# Patient Record
Sex: MALE | Race: WHITE | NOT HISPANIC OR LATINO | Employment: OTHER | URBAN - METROPOLITAN AREA
[De-identification: names, ages, dates, MRNs, and addresses within clinical notes are randomized per-mention and may not be internally consistent; named-entity substitution may affect disease eponyms.]

---

## 2024-08-29 ENCOUNTER — OFFICE VISIT (OUTPATIENT)
Dept: ORTHOPEDIC SURGERY | Facility: CLINIC | Age: 79
End: 2024-08-29
Payer: MEDICARE

## 2024-08-29 VITALS
WEIGHT: 224.4 LBS | SYSTOLIC BLOOD PRESSURE: 120 MMHG | BODY MASS INDEX: 36.07 KG/M2 | HEIGHT: 66 IN | DIASTOLIC BLOOD PRESSURE: 72 MMHG

## 2024-08-29 DIAGNOSIS — M75.42 IMPINGEMENT SYNDROME OF LEFT SHOULDER: ICD-10-CM

## 2024-08-29 DIAGNOSIS — M75.22 BICEPS TENDINITIS OF LEFT UPPER EXTREMITY: ICD-10-CM

## 2024-08-29 DIAGNOSIS — M25.511 BILATERAL SHOULDER PAIN, UNSPECIFIED CHRONICITY: ICD-10-CM

## 2024-08-29 DIAGNOSIS — M25.512 BILATERAL SHOULDER PAIN, UNSPECIFIED CHRONICITY: ICD-10-CM

## 2024-08-29 DIAGNOSIS — S46.001A INJURY OF RIGHT ROTATOR CUFF, INITIAL ENCOUNTER: ICD-10-CM

## 2024-08-29 DIAGNOSIS — M75.122 NONTRAUMATIC COMPLETE TEAR OF LEFT ROTATOR CUFF: Primary | ICD-10-CM

## 2024-08-29 DIAGNOSIS — M75.51 BURSITIS OF RIGHT SHOULDER: ICD-10-CM

## 2024-08-29 DIAGNOSIS — M75.41 IMPINGEMENT SYNDROME OF RIGHT SHOULDER: ICD-10-CM

## 2024-08-29 DIAGNOSIS — S46.002A INJURY OF LEFT ROTATOR CUFF, INITIAL ENCOUNTER: ICD-10-CM

## 2024-08-29 DIAGNOSIS — M75.52 BURSITIS OF LEFT SHOULDER: ICD-10-CM

## 2024-08-29 RX ORDER — CHOLECALCIFEROL (VITAMIN D3) 25 MCG
1000 TABLET ORAL 2 TIMES DAILY PRN
COMMUNITY

## 2024-08-29 NOTE — PROGRESS NOTES
"                                                                    [facility] Office Visit - Mario Clay MD  [facility]    Office Visit       Patient Name: Teofilo Florez    Chief Complaint:   Chief Complaint   Patient presents with    Left Shoulder - Pain, Initial Evaluation    Right Shoulder - Initial Evaluation, Pain       Referring Physician: Referring, Self    History of Present Illness:   Teofilo Florez is a 78 y.o. male who presents with bilateral body part: shoulder Reason: pain.  Onset:Onset: mechanical fall. The issue has been ongoing for 1+ year(s). Pain is a 2/10 on the pain scale (right shoulder) and 6/10 (left shoulder) . Pain is described as Pain Characterization: dull, aching, and stabbing. Associated symptoms include Symptoms: pain, popping, and stiffness. The pain is worse with sleeping, working, leisure, lying on affected side, and any movement of the joint; resting improve the pain. Previous treatments have included: NSAIDS and physical therapy.  I have reviewed the patient's history of present illness as noted/entered above.    I have reviewed the patient's past medical history, surgical history, social history, family history, medications, and allergies as noted in the electronic medical record and as noted/entered.  I have reviewed the patient's review of systems as noted/enter and updated as noted in the patient's HPI.    Bilateral shoulder pain  Right shoulder-fall 1.5 years ago, left shoulder pain at the gym 5 weeks ago    [facility]    Retired  Prior physical therapy in the past.    Granddaughter getting  in [place] soon    I've treated his wife, Amparo, in the past; left sided revision to [facility]    BILATERAL SHOULDER WEAKNESS -- \"very weak\"    LEFT is worse than the right  RHD            78 y.o. male  BMI 36.22    Subjective   Subjective      Review of Systems   Constitutional: Negative.  Negative for " chills, fatigue and fever.   HENT: Negative.  Negative for congestion and dental problem.    Eyes: Negative.  Negative for blurred vision.   Respiratory: Negative.  Negative for shortness of breath.    Cardiovascular: Negative.  Negative for leg swelling.   Gastrointestinal: Negative.  Negative for abdominal pain.   Endocrine: Negative.  Negative for polyuria.   Genitourinary: Negative.  Negative for difficulty urinating.   Musculoskeletal:  Positive for arthralgias.   Skin: Negative.    Allergic/Immunologic: Negative.    Neurological: Negative.    Hematological: Negative.  Negative for adenopathy.   Psychiatric/Behavioral: Negative.  Negative for behavioral problems.         Past Medical History:   Past Medical History:   Diagnosis Date    Ankle sprain     Cervical disc disorder     Knee swelling     Lumbosacral disc disease     Osgood-Schlatter's disease     Tear of meniscus of knee        Past Surgical History:   Past Surgical History:   Procedure Laterality Date    JOINT REPLACEMENT      KNEE SURGERY         Family History: History reviewed. No pertinent family history.    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    Drug use: Never    Sexual activity: Not Currently     Partners: Female       Medications:   Current Outpatient Medications:     aspirin 81 MG EC tablet, Take 1 tablet by mouth Daily., Disp: , Rfl:     Cholecalciferol 25 MCG (1000 UT) tablet, Take 1 tablet by mouth 2 (Two) Times a Day As Needed., Disp: , Rfl:     doxazosin (CARDURA) 8 MG tablet, Take 1 tablet by mouth every night., Disp: 30 tablet, Rfl: 1    esomeprazole (NexIUM) 40 MG capsule, Take 1 capsule by mouth every morning before breakfast., Disp: 30  "capsule, Rfl: 1    simvastatin (ZOCOR) 40 MG tablet, Take 1 tablet by mouth every night., Disp: 30 tablet, Rfl: 1    valsartan (DIOVAN) 40 MG tablet, , Disp: , Rfl:     Allergies: No Known Allergies    The following portions of the patient's history were reviewed and updated as appropriate: allergies, current medications, past family history, past medical history, past social history, past surgical history and problem list.        Objective    Objective      Vital Signs:   Vitals:    08/29/24 0944   BP: 120/72   Weight: 102 kg (224 lb 6.4 oz)   Height: 167.6 cm (66\")       Ortho Exam:  Left shoulder worse than right shoulder.  Significant pain and weakness of the left shoulder.    General: no acute distress, comfortable  Vitals reviewed in chart    Musculoskeletal Exam    SIDE: Left worse than right  Shoulder Exam:    Tenderness: rotator cuff    Range of motion measurements (degrees)  Forward flexion/Abduction/External rotation at side/ER at 90/IR at 90/IR position  Active: Pain and weakness with left shoulder, pain limited  Passive: Pain limited    Painful arc of motion: yes  No evidence of septic joint  Pain with forward flexion and abduction greater: 90 degrees  Impingement testing Neer's test - positive/painful  Impingement testing Hawkin's test - positive/painful    Rotator Cuff Testing:  Tenderness to palpation at rotator cuff - YES  Rotator cuff testing Conchita's test - positive  Rotator cuff testing External rotation -positive  Rotator cuff testing Lag signs - no  Rotator cuff testing Belly press - no  Pain with abduction great than 90 degrees - yes    Scapular dyskinesis - present, abnormal scapular motion    Long head of the biceps testing:  Hamm's test for biceps & Speed's test -positive  Bicipital groove tenderness to palpation/tenderness to palpation of biceps tendon -positive      Results Review:   Imaging Results (Last 24 Hours)       Procedure Component Value Units Date/Time    XR Shoulder 2+ View " Bilateral [[id number]] Resulted: 08/29/24 1008     Updated: 08/29/24 1009    Narrative:      Imaging: BILATERAL shoulder x-rays 3 views     Side: BILATERAL     Indication for BILATERAL  shoulder x-ray 3 views: BILATERAL shoulder pain    Comparison: no comparison views available    BILATERAL SHOULDER Findings: Bilateral shoulder imaging shows baseline   degenerative changes.  No acute bony pathology. No superior humeral head   migration.  The humeral head remains centered in the glenohumeral joint.   No evidence of calcific tendonitis.      I personally reviewed the above x-rays.            Procedures             Assessment / Plan      Assessment/Plan:   Problem List Items Addressed This Visit          Musculoskeletal and Injuries    Bilateral shoulder pain    Relevant Orders    XR Shoulder 2+ View Bilateral (Completed)    MRI Shoulder Left Without Contrast    Nontraumatic complete tear of left rotator cuff - Primary    Relevant Orders    MRI Shoulder Left Without Contrast    Injury of left rotator cuff    Relevant Orders    MRI Shoulder Left Without Contrast    Biceps tendinitis of left upper extremity    Relevant Orders    MRI Shoulder Left Without Contrast    Impingement syndrome of left shoulder    Relevant Orders    MRI Shoulder Left Without Contrast    Bursitis of left shoulder    Relevant Orders    MRI Shoulder Left Without Contrast    Injury of right rotator cuff    Relevant Orders    MRI Shoulder Left Without Contrast    Bursitis of right shoulder    Relevant Orders    MRI Shoulder Left Without Contrast    Impingement syndrome of right shoulder    Relevant Orders    MRI Shoulder Left Without Contrast       LEFT worse than right shoulder  MRI of the shoulder is recommended.  Indication: suspected rotator cuff tear  The MRI is critical to evaluate for rotator cuff tearing and will help with possible surgical planning.    Left shoulder much worse than right.  Left shoulder MRI indicated      Follow Up: After  left shoulder MRI        Mario Clay MD, FAAOS  Orthopedic Surgeon, Shoulder Surgery  [facility]  Orthopedics and Sports Medicine  [address], [address]  [place] [address]    & New Location:  [facility] Location  [address], [address]  [place] [address]    08/29/24  13:20 EDT

## 2024-09-11 ENCOUNTER — HOSPITAL ENCOUNTER (OUTPATIENT)
Facility: HOSPITAL | Age: 79
Discharge: HOME | End: 2024-09-11
Admitting: ORTHOPAEDIC SURGERY
Payer: MEDICARE

## 2024-09-11 DIAGNOSIS — M25.511 BILATERAL SHOULDER PAIN, UNSPECIFIED CHRONICITY: ICD-10-CM

## 2024-09-11 DIAGNOSIS — M75.22 BICEPS TENDINITIS OF LEFT UPPER EXTREMITY: ICD-10-CM

## 2024-09-11 DIAGNOSIS — M75.52 BURSITIS OF LEFT SHOULDER: ICD-10-CM

## 2024-09-11 DIAGNOSIS — M75.42 IMPINGEMENT SYNDROME OF LEFT SHOULDER: ICD-10-CM

## 2024-09-11 DIAGNOSIS — M75.51 BURSITIS OF RIGHT SHOULDER: ICD-10-CM

## 2024-09-11 DIAGNOSIS — M75.122 NONTRAUMATIC COMPLETE TEAR OF LEFT ROTATOR CUFF: ICD-10-CM

## 2024-09-11 DIAGNOSIS — M25.512 BILATERAL SHOULDER PAIN, UNSPECIFIED CHRONICITY: ICD-10-CM

## 2024-09-11 DIAGNOSIS — S46.002A INJURY OF LEFT ROTATOR CUFF, INITIAL ENCOUNTER: ICD-10-CM

## 2024-09-11 DIAGNOSIS — S46.001A INJURY OF RIGHT ROTATOR CUFF, INITIAL ENCOUNTER: ICD-10-CM

## 2024-09-11 DIAGNOSIS — M75.41 IMPINGEMENT SYNDROME OF RIGHT SHOULDER: ICD-10-CM

## 2024-09-11 PROCEDURE — 73221 MRI JOINT UPR EXTREM W/O DYE: CPT

## 2024-09-19 ENCOUNTER — OFFICE VISIT (OUTPATIENT)
Dept: ORTHOPEDIC SURGERY | Facility: CLINIC | Age: 79
End: 2024-09-19
Payer: MEDICARE

## 2024-09-19 VITALS
DIASTOLIC BLOOD PRESSURE: 72 MMHG | WEIGHT: 225 LBS | SYSTOLIC BLOOD PRESSURE: 118 MMHG | BODY MASS INDEX: 35.31 KG/M2 | HEIGHT: 67 IN

## 2024-09-19 DIAGNOSIS — M75.122 NONTRAUMATIC COMPLETE TEAR OF LEFT ROTATOR CUFF: ICD-10-CM

## 2024-09-19 DIAGNOSIS — M75.102 ROTATOR CUFF TEAR ARTHROPATHY OF LEFT SHOULDER: Primary | ICD-10-CM

## 2024-09-19 DIAGNOSIS — S46.002A INJURY OF LEFT ROTATOR CUFF, INITIAL ENCOUNTER: ICD-10-CM

## 2024-09-19 DIAGNOSIS — M12.812 ROTATOR CUFF TEAR ARTHROPATHY OF LEFT SHOULDER: Primary | ICD-10-CM

## 2024-10-02 ENCOUNTER — TREATMENT (OUTPATIENT)
Dept: PHYSICAL THERAPY | Facility: CLINIC | Age: 79
End: 2024-10-02
Payer: MEDICARE

## 2024-10-02 DIAGNOSIS — M75.102 NONTRAUMATIC TEAR OF LEFT ROTATOR CUFF, UNSPECIFIED TEAR EXTENT: Primary | ICD-10-CM

## 2024-10-02 NOTE — PROGRESS NOTES
Physical Therapy Initial Evaluation and Plan of Care  [facility] [place]  [address], [address] [address]        Patient: Teofilo Florez   : 1945  Diagnosis/ICD-10 Code:  Nontraumatic tear of left rotator cuff, unspecified tear extent [M75.102]  Referring practitioner: Mario Clay MD  Date of Initial Visit: 10/2/2024  Today's Date: 10/2/2024  Patient seen for 1 sessions         Visit Diagnoses:    ICD-10-CM ICD-9-CM   1. Nontraumatic tear of left rotator cuff, unspecified tear extent  M75.102 727.61         Subjective Questionnaire: QuickDASH: 45.45%    Subjective Evaluation    History of Present Illness  Mechanism of injury: 6-7 weeks ago at gym, pushing on a weight machine, felt immediate pain top of left shoulder down into biceps.  2-3 weeks later, pulled on a cart at [facility], severe pain started.    Pain  Current pain ratin  At best pain rating: 3  At worst pain ratin  Location: Constant left GH joint pain, anterior to posterior, some pain into scapula.  Quality: dull ache, squeezing, pulling, knife-like and discomfort  Relieving factors: rest and support  Aggravating factors: sleeping, repetitive movement, outstretched reach, lifting and movement  Progression: improved    Hand dominance: right    Diagnostic Tests  MRI studies: abnormal           Objective          Observations   Left Shoulder   Positive for atrophy.       Palpation   Left   Tenderness of the anterior deltoid and middle deltoid.     Tenderness     Left Shoulder   Tenderness in the biceps tendon (proximal) and subacromial bursa.     Cervical/Thoracic Screen   Cervical range of motion within normal limits  Thoracic range of motion within normal limits    Active Range of Motion   Left Shoulder   Flexion: 62 degrees   Extension: 52 degrees   Abduction: 62 degrees   External rotation 0°: 10 degrees   External rotation 45°: 5 degrees   Internal rotation BTB: L3     Passive Range of Motion    Left Shoulder   Flexion: 170 degrees   Abduction: 165 degrees   External rotation 90°: 85 degrees   Internal rotation 90°: 60 degrees     Joint Play   Left Shoulder  Joints within functional limits are the anterior capsule, posterior capsule and inferior capsule.     Strength/Myotome Testing     Left Shoulder     Planes of Motion   Flexion: 4-   Extension: 4+   Abduction: 3-   External rotation at 0°: 2-   Internal rotation at 0°: 4+     Isolated Muscles   Biceps: 5   Infraspinatus: 3   Subscapularis: 4-   Supraspinatus: 1   Triceps: 5     Tests     Left Shoulder   Positive drop arm, empty can and painful arc.   Negative Hawkin's, laxity (step off) and Neer's.           Assessment & Plan       Assessment  Impairments: abnormal muscle firing, abnormal or restricted ROM, activity intolerance, impaired physical strength and pain with function   Functional limitations: carrying objects, lifting, pulling, pushing, uncomfortable because of pain, reaching behind back and reaching overhead   Assessment details: 78 year old right handed male who is S/P left RTC injury.  MRI evidence of pathologies to supraspinatus, subscapularis, long head of the biceps, and joint surface.  Neurological exam is normal.  He presents loss of AROM/PROM left shoulder, weakness of shoulder girdle, pain, and decline in functional ability.    Prognosis: good    Goals  Plan Goals: STG to be met in 4 weeks  1.  Increase left shoulder flexion/abduction AROM to 90 degrees  2.  Increase left shoulder ER AROM to 35 degrees.  3.  Pt is able to stabilize left scapula without winging during flexion ROM.  4.  Pt function improves so that Quick Dash Score improves to 30%.  LTG to be met in 12 weeks  1.  Pt is independent with HEP.  2.  Pt has full functional shoulder AROM with tolerable discomfort .  3.  Pt function improved so that Quick Dash Score improves to 20%.  4.  Pt functional Strength of the shoulder girdle is 4/5 or greater.    Plan  Therapy  options: will be seen for skilled therapy services  Planned modality interventions: ultrasound, high voltage pulsed current (pain management) and dry needling  Planned therapy interventions: body mechanics training, functional ROM exercises, joint mobilization, home exercise program, therapeutic activities, stretching, strengthening, soft tissue mobilization, postural training, neuromuscular re-education and manual therapy  Frequency: 2x week (1-2x/week)  Duration in weeks: 12        Timed:         Manual Therapy:         mins  62447;     Therapeutic Exercise:    31     mins  41740;     Neuromuscular Geo:        mins  24022;    Therapeutic Activity:     13     mins  77582;     Gait Training:           mins  13801;     Ultrasound:          mins  74604;    Ionto                                   mins   47615  Self Care                            mins   89300  Canalith Repos         mins 77581      Un-Timed:  Electrical Stimulation:         mins  53774 (MC );  Dry Needling          mins self-pay  Traction          mins 74361  Low Eval     20     Mins  17443  Mod Eval          Mins  23555  High Eval                            Mins  75937        Timed Treatment:   44   mins   Total Treatment:     64   mins    Access Code: [email or web address]  URL: [email or web address]  Date: 10/02/2024  Prepared by: Cody Auguste    Exercises  - Supine Shoulder Flexion with Dowel  - 1-2 x daily - 7 x weekly - 3 sets - 10 reps  - Sidelying Shoulder External Rotation  - 1-2 x daily - 7 x weekly - 3 sets - 10 reps  - Shoulder Flexion Wall Slide with Towel  - 1-2 x daily - 7 x weekly - 3 sets - 10 reps  - Shoulder Mobilization Swiss Ball Diagonal  - 1-2 x daily - 7 x weekly - 3 sets - 10 reps      PT: Cody Auguste PT     License Number: [id number]  Electronically signed by Cody Auguste PT, 10/02/24, 8:01 AM EDT    Medicare Initial Certification  Certification Period: 12/31/2024  I certify that the therapy services are furnished while  this patient is under my care.  The services outlined above are required by this patient, and will be reviewed every 90 days.     PHYSICIAN: ________________________________________________________  Mario Clay MD        DATE: ____________________________________________________________    Please sign and return via fax to [phone].. Thank you, [facility].

## 2024-10-17 ENCOUNTER — TREATMENT (OUTPATIENT)
Dept: PHYSICAL THERAPY | Facility: CLINIC | Age: 79
End: 2024-10-17
Payer: MEDICARE

## 2024-10-17 DIAGNOSIS — M75.102 NONTRAUMATIC TEAR OF LEFT ROTATOR CUFF, UNSPECIFIED TEAR EXTENT: Primary | ICD-10-CM

## 2024-10-17 NOTE — PROGRESS NOTES
Physical Therapy Daily Treatment Note  [place] [facility] [place]  [address] [address] [facility], [address] [address]      Patient: Teofilo Florez   : 1945  Diagnosis/ICD-10 Code:  Nontraumatic tear of left rotator cuff, unspecified tear extent [M75.102]  Referring practitioner: No ref. provider found  Date of Initial Visit: Type: THERAPY  Noted: 10/2/2024  Today's Date: 10/17/2024  Patient seen for 2 sessions         Visit Diagnoses:    ICD-10-CM ICD-9-CM   1. Nontraumatic tear of left rotator cuff, unspecified tear extent  M75.102 727.61       Subjective   Teofilo Florez reports: Left shoulder feelings some better, moving better and less painful.  Still some pain especially with reaching out but not as severe as couple weeks ago.    Objective          Active Range of Motion   Left Shoulder   Flexion: 160 degrees   Abduction: 90 degrees   External rotation 90°: 65 degrees   Internal rotation 90°: 70 degrees     Passive Range of Motion   Left Shoulder   Flexion: 175 degrees   Abduction: 70 degrees   External rotation 90°: 90 degrees   Internal rotation 90°: 75 degrees     Joint Play     Additional Joint Play Details  Significant crepitus noted in the left glenohumeral joint with activities.    Strength/Myotome Testing     Left Shoulder     Planes of Motion   Flexion: 3+   Extension: 4+   Abduction: 3   External rotation at 0°: 3   Internal rotation at 0°: 4     Isolated Muscles   Biceps: 4   Triceps: 5         See Exercise, Manual, and Modality Logs for complete treatment.   Access Code: [email or web address]  URL: [email or web address]  Date: 10/17/2024  Prepared by: Cody Auguste    Exercises  - Shoulder extension with resistance - Neutral  - 1 x daily - 7 x weekly - 3 sets - 20 reps  - Standing Shoulder Row with Anchored Resistance  - 1 x daily - 7 x weekly - 3 sets - 20 reps    Assessment/Plan  Significant improvement in just 2 weeks.  Active range of motion is functional.  Passive range of motion is  considered normal now.  Showing improvement in functional strength around the left shoulder girdle.  Still considered very weak but he is able to move the upper extremity against gravity now.  From the sounds of the crepitus in the left glenohumeral joint suspect that the OA is significant.  Progress per Plan of Care and Progress strengthening /stabilization /functional activity           Timed:         Manual Therapy:    11     mins  60990;     Therapeutic Exercise:    25     mins  02378;     Neuromuscular Geo:        mins  47286;    Therapeutic Activity:     20     mins  67444;     Gait Training:           mins  91053;     Ultrasound:          mins  16777;    Ionto                                   mins   32311  Self Care                            mins   43292  Canalith Repos         mins 26636      Un-Timed:  Electrical Stimulation:         mins  52295 ( );  Dry Needling          mins self-pay  Traction          mins 40302      Timed Treatment:   56   mins   Total Treatment:     56   mins    Cody Auguste, PT  [place] License: [id number]

## 2024-10-24 ENCOUNTER — TREATMENT (OUTPATIENT)
Dept: PHYSICAL THERAPY | Facility: CLINIC | Age: 79
End: 2024-10-24
Payer: MEDICARE

## 2024-10-24 DIAGNOSIS — M75.102 NONTRAUMATIC TEAR OF LEFT ROTATOR CUFF, UNSPECIFIED TEAR EXTENT: Primary | ICD-10-CM

## 2024-10-25 NOTE — PROGRESS NOTES
Physical Therapy Daily Treatment Note  [facility] [place]  [address] [facility], [address] [address]      Patient: Teofilo Florez   : 1945  Diagnosis/ICD-10 Code:  Nontraumatic tear of left rotator cuff, unspecified tear extent [M75.102]  Referring practitioner: Mario Clay MD  Date of Initial Visit: Type: THERAPY  Noted: 10/2/2024  Today's Date: 10/24/2024  Patient seen for 3 sessions         Visit Diagnoses:    ICD-10-CM ICD-9-CM   1. Nontraumatic tear of left rotator cuff, unspecified tear extent  M75.102 727.61       Subjective   Teofilo Florez reports: Left shoulder is far less painful.  Still some weakness.  Sleeping much better.    Objective   Active Range of Motion   Left Shoulder   Flexion: 158 degrees   Abduction: 102 degrees   External rotation 90°: 70 degrees   Internal rotation 90°: 70 degrees     Passive Range of Motion   Left Shoulder   Flexion: 175 degrees   Abduction: 105 degrees   External rotation 90°: 90 degrees   Internal rotation 90°: 75 degrees     Joint Play   crepitus noted in the left glenohumeral joint with activities.    Strength/Myotome Testing     Left Shoulder     Planes of Motion   Flexion: 4-/5  Extension: 4+/5  Abduction: 3/5   External rotation at 0°: 3+/5  Internal rotation at 0°: 5-/5     Isolated Muscles   Biceps: 4+/5   Triceps: 5  /5  See Exercise, Manual, and Modality Logs for complete treatment.       Assessment/Plan  Still has signs and symptoms of significant rotator cuff pathology.  However his active range of motion and passive range of motion are all functional now.  He shown improvement in strength around the shoulder girdle.  Pain is decreasing.  Patient responding positively to rehab.  Progress per Plan of Care, Progress strengthening /stabilization /functional activity, and Anticipate DC next Visit           Timed:         Manual Therapy:         mins  64705;     Therapeutic Exercise:    26     mins  23096;      Neuromuscular Geo:        mins  01655;    Therapeutic Activity:     18     mins  69148;     Gait Training:           mins  69500;     Ultrasound:          mins  71378;    Ionto                                   mins   81614  Self Care                            mins   97575  Canalith Repos         mins 50674      Un-Timed:  Electrical Stimulation:         mins  41972 ( );  Dry Needling          mins self-pay  Traction          mins 31370      Timed Treatment:   44   mins   Total Treatment:     44   mins    Cody Auguste, PT  [place] License: [id number]

## 2024-10-31 ENCOUNTER — TREATMENT (OUTPATIENT)
Dept: PHYSICAL THERAPY | Facility: CLINIC | Age: 79
End: 2024-10-31
Payer: MEDICARE

## 2024-10-31 DIAGNOSIS — M75.102 NONTRAUMATIC TEAR OF LEFT ROTATOR CUFF, UNSPECIFIED TEAR EXTENT: Primary | ICD-10-CM

## 2024-10-31 NOTE — PROGRESS NOTES
Physical Therapy Daily Treatment Note  [facility] [place]  [address] [facility], [address] [address]      Patient: Teofilo Florez   : 1945  Diagnosis/ICD-10 Code:  Nontraumatic tear of left rotator cuff, unspecified tear extent [M75.102]  Referring practitioner: Mario Clay MD  Date of Initial Visit: Type: THERAPY  Noted: 10/2/2024  Today's Date: 10/31/2024  Patient seen for 4 sessions         Visit Diagnoses:    ICD-10-CM ICD-9-CM   1. Nontraumatic tear of left rotator cuff, unspecified tear extent  M75.102 727.61       Subjective   Teofilo Folrez reports: left shoulder is moving better. Some lateral deltoid pain and anterior GH joint    Objective   Active Range of Motion   Left Shoulder   Flexion: 155 degrees   Abduction: 150 degrees   External rotation 90°: 73 degrees   Internal rotation 90°: 70 degrees     Passive Range of Motion   Left Shoulder   Flexion: 175 degrees   Abduction: 1170 degrees   External rotation 90°: 90 degrees   Internal rotation 90°: 75 degrees     Joint Play   crepitus noted in the left glenohumeral joint with activities.    Strength/Myotome Testing     Left Shoulder     Planes of Motion   Flexion: 4/5  Extension: 4+/5  Abduction: 3+/5   External rotation at 0°: 3+/5  Internal rotation at 0°: 5-/5     Isolated Muscles   Biceps: 5/5   Triceps: 5 /5    See Exercise, Manual, and Modality Logs for complete treatment.   Access Code: [email or web address]  URL: [email or web address]  Date: 10/31/2024  Prepared by: Cody Auguste    Exercises  - Shoulder Overhead Press in Flexion with Dumbbells  - 1 x daily - 4 x weekly - 3 sets - 10 reps  - Prone Single Arm Shoulder Horizontal Abduction with Dumbbell  - 1 x daily - 4 x weekly - 3 sets - 10 reps  - Prone Shoulder Extension - Single Arm  - 1 x daily - 4 x weekly - 3 sets - 10 reps    Assessment/Plan  Active and passive shoulder range motion continues to improve.  His scapular stabilization has shown significant  improvement strength as he is able to handle 3 pounds on prone activities now.  Still weak into abduction and with some overhead pressing activities but the fact that he can do it now is a sign of tremendous improvement.  Warrants continuation of therapy.  Progress per Plan of Care and Progress strengthening /stabilization /functional activity           Timed:         Manual Therapy:         mins  76744;     Therapeutic Exercise:    28     mins  65311;     Neuromuscular Geo:        mins  97071;    Therapeutic Activity:     26     mins  84338;     Gait Training:           mins  72928;     Ultrasound:          mins  00662;    Ionto                                   mins   91997  Self Care                            mins   96590  Canalith Repos         mins 51680      Un-Timed:  Electrical Stimulation:         mins  86256 ( );  Dry Needling          mins self-pay  Traction          mins 74798      Timed Treatment:   54   mins   Total Treatment:     54   mins    Cody Auguste, PT  [place] License: [id number]

## 2024-11-07 ENCOUNTER — TREATMENT (OUTPATIENT)
Dept: PHYSICAL THERAPY | Facility: CLINIC | Age: 79
End: 2024-11-07
Payer: MEDICARE

## 2024-11-07 DIAGNOSIS — M75.102 NONTRAUMATIC TEAR OF LEFT ROTATOR CUFF, UNSPECIFIED TEAR EXTENT: Primary | ICD-10-CM

## 2024-11-07 NOTE — PROGRESS NOTES
Physical Therapy Daily Treatment Note  [facility] PHYSICAL THERAPY [place]  [address], [address]  [facility] [id number]      Patient: Teofilo Florez   : 1945  Diagnosis/ICD-10 Code:  Nontraumatic tear of left rotator cuff, unspecified tear extent [M75.102]  Referring practitioner: Mario Clay MD  Date of Initial Visit: Type: THERAPY  Noted: 10/2/2024  Today's Date: 2024  Patient seen for 5 sessions         Visit Diagnoses:    ICD-10-CM ICD-9-CM   1. Nontraumatic tear of left rotator cuff, unspecified tear extent  M75.102 727.61       Subjective   Teofilo Florez reports: Shoulder is a little sore today but most days it does not hurt anymore.  She is weak with overhead activities but is able to to do most activities overhead with the exception of lifting.  Sleeping without being awakened by pain now.    Objective   OBSERVATION: No guarding of the left upper extremity now.  PALPATION: Left shoulder unremarkable.  AROM: Left shoulder: Flexion 171 deg, abduction 170 deg, extension 56, deg external rotation 78 deg, internal rotation 70 deg  PROM: Left shoulder: Full range of motion of left shoulder all planes of motion.  STRENGTH: External rotation 3+/5, internal rotation 5/5, bicep 5/5, scapular stabilizers 5/5, lateral and anterior deltoid 5/5.  OTHER: Patient can tolerate lifting 2-3 pounds overhead for a few repetitions now.  No pain with his activity just quick fatigue.  He is independent with his home exercise programs.    See Exercise, Manual, and Modality Logs for complete treatment.   Access Code: [email or web address]  URL: [email or web address]  Date: 2024  Prepared by: Cody Auguste    Exercises  - Supine PNF D2 Flexion with Resistance  - 1 x daily - 7 x weekly - 3 sets - 10 reps  - Standing Single Arm Shoulder PNF D1 Flexion  - 1 x daily - 7 x weekly - 3 sets - 10 reps    Assessment/Plan  This patient has signs and symptoms of significant rotator cuff pathology in the left  shoulder.  However his function has improved dramatically.  He is basically pain-free with exceptions of few activities and those activities tolerable now.  He is still weak with overhead activities but at shoulder level and below he is strong and can function normally.  Sleeping through the night without being awakened by pain.  At this point I think he has a functional shoulder, he is independent with his home program, and no longer needs skilled physical therapy.    Other: The patient will contact us if he has exacerbations or feels like he is regressing.  If we do not hear from the patient next 30 days he will be consider discharge from our services.           Timed:         Manual Therapy:         mins  15108;     Therapeutic Exercise:    25     mins  97622;     Neuromuscular Geo:    10    mins  81365;    Therapeutic Activity:     19     mins  39570;     Gait Training:           mins  15469;     Ultrasound:          mins  51312;    Ionto                                   mins   98386  Self Care                            mins   42312  Canalith Repos         mins 40765      Un-Timed:  Electrical Stimulation:         mins  55905 ([facility] );  Dry Needling          mins self-pay  Traction          mins 18664      Timed Treatment:   54   mins   Total Treatment:     54   mins    Cody Auguste, PT  [place] License: [id number]

## 2024-11-21 ENCOUNTER — OFFICE VISIT (OUTPATIENT)
Dept: ORTHOPEDIC SURGERY | Facility: CLINIC | Age: 79
End: 2024-11-21
Payer: MEDICARE

## 2024-11-21 VITALS
BODY MASS INDEX: 33.9 KG/M2 | SYSTOLIC BLOOD PRESSURE: 118 MMHG | HEIGHT: 67 IN | WEIGHT: 216 LBS | DIASTOLIC BLOOD PRESSURE: 72 MMHG

## 2024-11-21 DIAGNOSIS — S46.002A INJURY OF LEFT ROTATOR CUFF, INITIAL ENCOUNTER: ICD-10-CM

## 2024-11-21 DIAGNOSIS — M12.812 ROTATOR CUFF TEAR ARTHROPATHY OF LEFT SHOULDER: ICD-10-CM

## 2024-11-21 DIAGNOSIS — M75.102 ROTATOR CUFF TEAR ARTHROPATHY OF LEFT SHOULDER: ICD-10-CM

## 2024-11-21 DIAGNOSIS — M75.122 NONTRAUMATIC COMPLETE TEAR OF LEFT ROTATOR CUFF: Primary | ICD-10-CM

## 2024-11-21 RX ORDER — FINASTERIDE 5 MG/1
5 TABLET, FILM COATED ORAL DAILY
COMMUNITY
Start: 2024-11-08 | End: 2025-11-08

## 2024-11-21 RX ORDER — TADALAFIL 10 MG/1
10 TABLET ORAL
COMMUNITY
Start: 2024-11-08

## 2024-11-21 NOTE — PROGRESS NOTES
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 provider found    History of Present Illness:   It has been 2  month(s) since Teofilo Florez's last visit. Teofilo Florez returns to clinic today for F/U: follow-up of leftBody Part: shoulderReason: pain. The issue has been ongoing for 3 months. Teofilo Florez rates HIS/HER: hispain at 2/10 on the pain scale. Previous/current treatments: NSAIDS and physical therapy. Current symptoms:Symptoms: pain, popping, and stiffness. The pain is worse with sleeping, lying on affected side, and any movement of the joint; resting improves the pain. Overall, he/she: heis doing better. I have reviewed the patient's history of present illness as noted/entered above.    I have reviewed the patient's past medical history, surgical history, social history, family history, medications, and allergies as noted in the electronic medical record and as noted/entered.  I have reviewed the patient's review of systems as noted/enter and updated as noted in the patient's HPI.      Bilateral shoulder pain  Right shoulder-fall 1.5 years ago, left shoulder pain at the gym 5 weeks ago     [facility]     Retired  Prior physical therapy in the past.    Granddaughter getting  in [place] soon    I've treated his wife, Amparo, in the past; left sided revision to [facility]     BILATERAL SHOULDER WEAKNESS -- \"very weak\"    LEFT is worse than the right  RHD     Amparo did PT with Cody Auguste; excellent care; she is attending [employer] conference in [place]           78 y.o. male  BMI 36.22        9/19/2024:  Left shoulder " worse than right  MRI left shoulder completed  He was pseudoparalytic before but now started to get some of his motion back.  Overall he still has significant limitations.  Counseled on MRI findings appearance of massive chronic rotator cuff tearing with early rotator cuff tear arthropathy, rounding of the humeral head.    MRI Shoulder Left Without Contrast     Result Date: 9/11/2024  1.Complete, full-thickness full width tearing supraspinatus infraspinatus tendon fibers from insertion site. This is a massive tear. Medial retraction musculotendinous junction measures 1 cm. Mild muscle edema and atrophy. 2.High-grade partial-thickness articular surface tearing superior subscapularis fibers. This is a large tear. Moderate muscle atrophy. 3.Moderate arthritis glenohumeral joint with joint effusion. 4.High-grade partial-thickness interstitial tearing long head biceps tendon. 5.Moderate amount subacromial-subdeltoid bursal fluid. 6.Increased signal intensity anterior and lateral deltoid muscle likely due to partial-thickness tearing. Electronically Signed: Cyndi Godoy MD  9/11/2024 11:56 AM EDT  Workstation ID: [id number]     MRI SHOULDER LEFT WO CONTRAST     Date of Exam: 9/11/2024 7:47 AM EDT     Indication: LEFT SHOULDER suspect chronic rotator cuff tearing.     Comparison: Shoulder radiograph 8/29/2024     Technique:  Routine multiplanar/multisequence images of the left shoulder were obtained without contrast administration.          Findings:  Rotator cuff:  Supraspinatus and infraspinatus tendons: There is a complete, full-thickness full width tear supraspinatus infraspinatus tendon from the insertion site. The tear measures 3.8 cm mediolateral by 3.9 cm anterior posterior. Medial retraction of the   musculotendinous junction measures 1 cm. There is mild muscular edema and mild muscular atrophy.  Subscapularis tendon: There is a high-grade partial-thickness articular surface tear of the superior subscapularis fibers.  This measures 2.2 cm mediolateral by 2.4 cm cranial caudal. Moderate muscle atrophy. No muscle edema.  Teres minor tendon: No tendon abnormality. No significant muscle atrophy.     Glenohumeral joint:  Humeral head is superiorly and slightly posteriorly located in the glenoid. Moderate size joint effusion. There is moderate cartilage narrowing. The inferior joint capsule is normal thickness and signal intensity.     Labrum:  Diffuse labral tearing. No paralabral cysts.     Biceps tendon:  Long head biceps tendon has high signal intensity along its proximal extent. There are intact fibers inserting on the labrum.     Acromioclavicular Joint:  Mild to moderate degenerative change. No abnormal bone marrow edema. No os acromiale. No significant lateral downsloping of the acromion.     Bone:  No fractures or aggressive osseous lesions. Bone marrow signal intensity is normal.     Miscellaneous:  Moderate amount subacromial subdeltoid fluid. No pathologically enlarged axillary lymph nodes. Deltoid muscle has increased signal intensity at the anterior lateral aspect that could be seen with partial-thickness tearing..     IMPRESSION:  1.Complete, full-thickness full width tearing supraspinatus infraspinatus tendon fibers from insertion site. This is a massive tear. Medial retraction musculotendinous junction measures 1 cm. Mild muscle edema and atrophy.  2.High-grade partial-thickness articular surface tearing superior subscapularis fibers. This is a large tear. Moderate muscle atrophy.  3.Moderate arthritis glenohumeral joint with joint effusion.  4.High-grade partial-thickness interstitial tearing long head biceps tendon.  5.Moderate amount subacromial-subdeltoid bursal fluid.  6.Increased signal intensity anterior and lateral deltoid muscle likely due to partial-thickness tearing.           Electronically Signed: Cyndi Godoy MD    9/11/2024 11:56 AM EDT    Workstation ID: [id number]        I personally reviewed and  personally interpreted the imaging above.  Left shoulder chronic massive irreparable rotator cuff tears with fatty infiltration atrophy, tearing of the supraspinatus, infraspinatus, subscapularis.  Rounding of the superior surface of the humeral head indicative of chronic rotator cuff tear arthropathy       11/21/2024:  LEFT SHOULDER, massive rotator cuff tearing, early cuff tear arthropathy  Completed PT with David because he  Pleased with improvements  Unfortunately they are going through divorce but he seems to be in good spirits and doing well.    Subjective   Subjective      Review of Systems   Constitutional: Negative.  Negative for chills, fatigue and fever.   HENT: Negative.  Negative for congestion and dental problem.    Eyes: Negative.  Negative for blurred vision.   Respiratory: Negative.  Negative for shortness of breath.    Cardiovascular: Negative.  Negative for leg swelling.   Gastrointestinal: Negative.  Negative for abdominal pain.   Endocrine: Negative.  Negative for polyuria.   Genitourinary: Negative.  Negative for difficulty urinating.   Musculoskeletal:  Positive for arthralgias.   Skin: Negative.    Allergic/Immunologic: Negative.    Neurological: Negative.    Hematological: Negative.  Negative for adenopathy.   Psychiatric/Behavioral: Negative.  Negative for behavioral problems.         Past Medical History:   Past Medical History:   Diagnosis Date    Ankle sprain     Cervical disc disorder     Knee swelling     Lumbosacral disc disease     Osgood-Schlatter's disease     Tear of meniscus of knee        Past Surgical History:   Past Surgical History:   Procedure Laterality Date    JOINT REPLACEMENT      KNEE SURGERY         Family History: History reviewed. No pertinent family history.    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    Drug use: Never    Sexual activity: Not Currently     Partners: Female       Medications:   Current Outpatient Medications:     aspirin 81 MG EC tablet, Take 1 tablet by mouth Daily., Disp: , Rfl:     Cholecalciferol 25 MCG (1000 UT) tablet, Take 1 tablet by mouth 2 (Two) Times a Day As Needed., Disp: , Rfl:     doxazosin (CARDURA) 8 MG tablet, Take 1 tablet by mouth every night., Disp: 30 tablet, Rfl: 1    esomeprazole (NexIUM) 40 MG capsule, Take 1 capsule by mouth every morning before breakfast., Disp: 30 capsule, Rfl: 1    finasteride (PROSCAR) 5 MG tablet, Take 1 tablet by mouth Daily., Disp: , Rfl:     simvastatin (ZOCOR) 40 MG tablet, Take 1 tablet by mouth every night., Disp: 30 tablet, Rfl: 1    tadalafil (CIALIS) 10 MG tablet, Take 1 tablet by mouth., Disp: , Rfl:     valsartan (DIOVAN) 40 MG tablet, , Disp: , Rfl:     Allergies: No Known Allergies    The following portions of the patient's history were reviewed and updated as appropriate: allergies, current medications, past family history, past medical history, past social history, past surgical history and problem list.        Objective    Objective      Vital Signs:   Vitals:    11/21/24 0948   BP: 118/72   Weight: 98 kg (216 lb)   Height: 170.2 cm (67\")       Ortho Exam:  LEFT SHOULDER  Incredible improvements with arc of motion and strength with deltoid compensation.  Smooth arc of motion despite known chronic massive tears.    Results Review:  Imaging Results (Last 24 Hours)       ** No results found for the last 24 hours. 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    Follow Up: He will keep me posted pending progress he is doing very well at this time      Mario Clay MD, FAAOS  Orthopedic Surgeon, Shoulder Surgery  [facility]  Orthopedics and Sports Medicine  [address], [address]  [place] [address]    & New Location:  [facility] Location  [address], [address]  [place] [address]    11/21/24  10:17 EST